# Patient Record
Sex: MALE | URBAN - METROPOLITAN AREA
[De-identification: names, ages, dates, MRNs, and addresses within clinical notes are randomized per-mention and may not be internally consistent; named-entity substitution may affect disease eponyms.]

---

## 2023-01-23 ENCOUNTER — OFFICE VISIT (OUTPATIENT)
Dept: URBAN - METROPOLITAN AREA CLINIC 30 | Facility: CLINIC | Age: 48
End: 2023-01-23
Payer: COMMERCIAL

## 2023-01-23 DIAGNOSIS — H10.31 ACUTE CONJUNCTIVITIS OF RT EYE: Primary | ICD-10-CM

## 2023-01-23 PROCEDURE — 99204 OFFICE O/P NEW MOD 45 MIN: CPT | Performed by: OPTOMETRIST

## 2023-01-23 RX ORDER — LOTEPREDNOL ETABONATE 3.8 MG/G
0.38 % GEL OPHTHALMIC
Qty: 1 | Refills: 0 | Status: ACTIVE
Start: 2023-01-23

## 2023-01-23 ASSESSMENT — INTRAOCULAR PRESSURE
OD: 19
OS: 19

## 2023-01-23 NOTE — IMPRESSION/PLAN
Impression: Acute conjunctivitis of rt eye: H10.31. Plan: Onset x 1 days ago. Hx of CLW x 20 years. Pt uses monthly disposables, that he takes out daily/replaced monthly. Pt notes tearing OS. Appears non-infectious. No corneal involvement. Also consider DDx episcleritis?/scleritis. Some pain c movement. Start Lotemax SM TID OD. D/C CLW for now.